# Patient Record
Sex: MALE | Race: WHITE | HISPANIC OR LATINO | Employment: UNEMPLOYED | URBAN - METROPOLITAN AREA
[De-identification: names, ages, dates, MRNs, and addresses within clinical notes are randomized per-mention and may not be internally consistent; named-entity substitution may affect disease eponyms.]

---

## 2019-10-08 ENCOUNTER — TELEPHONE (OUTPATIENT)
Dept: URGENT CARE | Facility: CLINIC | Age: 74
End: 2019-10-08

## 2019-10-08 ENCOUNTER — OFFICE VISIT (OUTPATIENT)
Dept: URGENT CARE | Facility: CLINIC | Age: 74
End: 2019-10-08

## 2019-10-08 VITALS
SYSTOLIC BLOOD PRESSURE: 139 MMHG | OXYGEN SATURATION: 96 % | BODY MASS INDEX: 25.86 KG/M2 | HEIGHT: 77 IN | WEIGHT: 219 LBS | DIASTOLIC BLOOD PRESSURE: 53 MMHG | RESPIRATION RATE: 18 BRPM | HEART RATE: 72 BPM | TEMPERATURE: 98 F

## 2019-10-08 DIAGNOSIS — L08.9 INFECTED WOUND: Primary | ICD-10-CM

## 2019-10-08 DIAGNOSIS — T14.8XXA INFECTED WOUND: Primary | ICD-10-CM

## 2019-10-08 DIAGNOSIS — R73.9 HYPERGLYCEMIA: ICD-10-CM

## 2019-10-08 DIAGNOSIS — L03.116 CELLULITIS OF LEFT FOOT: ICD-10-CM

## 2019-10-08 DIAGNOSIS — L30.9 DERMATITIS: ICD-10-CM

## 2019-10-08 LAB — GLUCOSE SERPL-MCNC: 128 MG/DL (ref 70–110)

## 2019-10-08 PROCEDURE — 82962 GLUCOSE BLOOD TEST: CPT | Mod: S$GLB,,, | Performed by: PHYSICIAN ASSISTANT

## 2019-10-08 PROCEDURE — 73630 XR FOOT COMPLETE 3 VIEW LEFT: ICD-10-PCS | Mod: FY,LT,S$GLB, | Performed by: RADIOLOGY

## 2019-10-08 PROCEDURE — 73630 X-RAY EXAM OF FOOT: CPT | Mod: FY,LT,S$GLB, | Performed by: RADIOLOGY

## 2019-10-08 PROCEDURE — 99203 PR OFFICE/OUTPT VISIT, NEW, LEVL III, 30-44 MIN: ICD-10-PCS | Mod: S$GLB,,, | Performed by: PHYSICIAN ASSISTANT

## 2019-10-08 PROCEDURE — 82962 POCT GLUCOSE, HAND-HELD DEVICE: ICD-10-PCS | Mod: S$GLB,,, | Performed by: PHYSICIAN ASSISTANT

## 2019-10-08 PROCEDURE — 99203 OFFICE O/P NEW LOW 30 MIN: CPT | Mod: S$GLB,,, | Performed by: PHYSICIAN ASSISTANT

## 2019-10-08 RX ORDER — MUPIROCIN 20 MG/G
OINTMENT TOPICAL
Qty: 22 G | Refills: 0 | Status: SHIPPED | OUTPATIENT
Start: 2019-10-08

## 2019-10-08 RX ORDER — IBUPROFEN 200 MG
200 TABLET ORAL EVERY 6 HOURS PRN
COMMUNITY

## 2019-10-08 RX ORDER — CLINDAMYCIN HYDROCHLORIDE 300 MG/1
300 CAPSULE ORAL 3 TIMES DAILY
Qty: 21 CAPSULE | Refills: 0 | Status: SHIPPED | OUTPATIENT
Start: 2019-10-08 | End: 2019-10-15

## 2019-10-08 RX ORDER — CLINDAMYCIN PHOSPHATE 150 MG/ML
600 INJECTION, SOLUTION INTRAVENOUS
Status: COMPLETED | OUTPATIENT
Start: 2019-10-08 | End: 2019-10-08

## 2019-10-08 RX ADMIN — CLINDAMYCIN PHOSPHATE 600 MG: 150 INJECTION, SOLUTION INTRAVENOUS at 12:10

## 2019-10-08 NOTE — PROGRESS NOTES
"Subjective:       Patient ID: Jerel Resendez is a 74 y.o. male.    Vitals:  height is 6' 5" (1.956 m) and weight is 99.3 kg (219 lb). His temperature is 97.7 °F (36.5 °C). His blood pressure is 139/53 (abnormal) and his pulse is 72. His respiration is 18 and oxygen saturation is 96%.     Chief Complaint: Foot Pain    Pt states his foot and arm are swollen and draining.     Foot Pain   This is a new problem. The current episode started more than 1 month ago. The problem occurs constantly. The problem has been gradually worsening. Pertinent negatives include no arthralgias, chest pain, chills, congestion, coughing, fatigue, fever, headaches, joint swelling, myalgias, nausea, rash, sore throat, vertigo or vomiting. Treatments tried: amoxicillin. The treatment provided no relief.       Constitution: Negative for chills, fatigue and fever.   HENT: Negative for congestion and sore throat.    Neck: Negative for painful lymph nodes.   Cardiovascular: Negative for chest pain and leg swelling.   Eyes: Negative for double vision and blurred vision.   Respiratory: Negative for cough and shortness of breath.    Gastrointestinal: Negative for nausea, vomiting and diarrhea.   Genitourinary: Negative for dysuria, frequency and urgency.   Musculoskeletal: Positive for pain. Negative for joint pain, joint swelling, muscle cramps and muscle ache.   Skin: Positive for erythema. Negative for color change, pale and rash.   Allergic/Immunologic: Negative for seasonal allergies.   Neurological: Negative for dizziness, history of vertigo, light-headedness, passing out and headaches.   Hematologic/Lymphatic: Negative for swollen lymph nodes, easy bruising/bleeding and history of blood clots. Does not bruise/bleed easily.   Psychiatric/Behavioral: Negative for nervous/anxious, sleep disturbance and depression. The patient is not nervous/anxious.        Objective:      Physical Exam   Constitutional: He is oriented to person, " place, and time. He appears well-developed and well-nourished. He is cooperative.  Non-toxic appearance. He does not appear ill. No distress.       Areas of concern are noted in pictures.    Oozing weeping noted to left foot wound.  Wound irrigated, slightly debrided, antibiotic topical ointment placed.  Nonstick bandage placed.   HENT:   Head: Normocephalic and atraumatic. Head is without abrasion, without contusion and without laceration.   Right Ear: Hearing, tympanic membrane, external ear and ear canal normal.   Left Ear: Hearing, tympanic membrane, external ear and ear canal normal.   Nose: Nose normal. No mucosal edema, rhinorrhea or nasal deformity. No epistaxis. Right sinus exhibits no maxillary sinus tenderness and no frontal sinus tenderness. Left sinus exhibits no maxillary sinus tenderness and no frontal sinus tenderness.   Mouth/Throat: Uvula is midline, oropharynx is clear and moist and mucous membranes are normal. No trismus in the jaw. Normal dentition. No uvula swelling. No posterior oropharyngeal erythema.   Eyes: Pupils are equal, round, and reactive to light. Conjunctivae, EOM and lids are normal. Right eye exhibits no discharge. Left eye exhibits no discharge. No scleral icterus.   Neck: Trachea normal, normal range of motion, full passive range of motion without pain and phonation normal. Neck supple.   Cardiovascular: Normal rate, regular rhythm, normal heart sounds, intact distal pulses and normal pulses.   Pulmonary/Chest: Effort normal and breath sounds normal. No stridor. No respiratory distress.   Abdominal: Soft. Normal appearance and bowel sounds are normal. He exhibits no distension, no pulsatile midline mass and no mass. There is no tenderness.   Musculoskeletal: Normal range of motion. He exhibits no edema or deformity.   Neurological: He is alert and oriented to person, place, and time. He exhibits normal muscle tone. Coordination normal.   Skin: Skin is warm, dry, intact, not  diaphoretic, not pale, rash, not urticarial, not purpuric and not vesicular. Capillary refill takes less than 2 seconds. Lesions:  erythemaabrasion, burn, bruising and ecchymosis  Psychiatric: He has a normal mood and affect. His speech is normal and behavior is normal. Judgment and thought content normal. Cognition and memory are normal.   Nursing note and vitals reviewed.              XRAY foot: Slight hallux valgus configuration.  Lisfranc articulation is congruent.  No displaced fracture, dislocation or destructive osseous process.  Mild degenerative change at the 1st MTP joint and dorsal midfoot.  Enthesophyte at the Achilles insertion site.  Scattered atherosclerotic vascular calcifications noted.  No subcutaneous emphysema or radiodense retained foreign body.    Assessment:       1. Infected wound    2. Cellulitis of left foot    3. Hyperglycemia    4. Dermatitis        Plan:         Infected wound  -     X-Ray Foot Complete Left; Future; Expected date: 10/08/2019  -     Ambulatory referral to Wound Clinic  -     POCT Glucose, Hand-Held Device  -     Ambulatory referral to Internal Medicine  -     clindamycin injection 600 mg  -     clindamycin (CLEOCIN) 300 MG capsule; Take 1 capsule (300 mg total) by mouth 3 (three) times daily. for 7 days  Dispense: 21 capsule; Refill: 0  -     mupirocin (BACTROBAN) 2 % ointment; Apply to affected area 3 times daily  Dispense: 22 g; Refill: 0    Cellulitis of left foot  -     Ambulatory referral to Internal Medicine    Hyperglycemia  -     Ambulatory referral to Internal Medicine    Dermatitis          Instrucciones de herson para la celulitis  A usted le kc diagnosticado celulitis, jakob infección que ocurre en la capa más profunda de la piel. La celulitis es causada por bacterias, que pueden entrar al cuerpo a través de cualquier tipo de abertura en la piel (melvin jakob cortada, un rasguño, jakob mordedura de animal o jakob picadura de insecto que se ha rascado). Es posible que le  hayan dado tratamiento en el hospital con antibióticos y líquidos por vía intravenosa. Solis vez le receten antibióticos para jaime en casa. Estas son algunas instrucciones para el cuidado en castellon casa.  Cuidados en la casa  · Asegúrese de jaime todos los medicamentos que le kc dado hasta que se terminen, aun si se siente mejor. Si no termina los medicamentos, es posible que la infección reaparezca. No terminar los medicamentos puede hacer que cualquier infección futura sea más difícil de tratar.   · Mantenga limpia el área infectada.  · Si es posible, eleve castellon herida. Esta práctica le ayudará a bajar la hinchazón.  · Consulte con castellon médico si siente dolor. Pregunte a castellon médico si puede jaime medicamentos sin receta para aliviar el dolor.  · Aplique vendajes limpios o apósitos de gasa según le indique castellon médico.  · Tómese la temperatura todos los días vicenta jakob semana. Informe a castellon médico si tiene fiebre.  · Para prevenir las infecciones, lávese frecuentemente las regla.  En el futuro, lávese las regla antes y después de tocar cualquier tipo de cortadas, rasguños o vendajes. [place] le ayudará a prevenir infecciones.  Visitas de control  Programe jakob visita de control según le indique el personal médico.  Cuándo debe llamar a castellon médico  Llame a castellon médico de inmediato si nota cualquiera de estos síntomas:  · Vómito  · Fiebre de 100.4°F (38°C) o más, o lo que le indique el proveedor de atención médica  · Escalofríos temblorosos  · Enrojecimiento que empeora en o alrededor del área infectada, particularmente si el área enrojecida se expande.  · Hinchazón del área infectada  · Dolor que empeora en o alrededor del área infectada  · Dificultad o dolor al  las articulaciones por encima o por debajo del área infectada  · Secreción o pus procedentes del área afectada   Date Last Reviewed: 10/16/2013  © 7911-7460 [employer]. [address] [place] [address], [place] [address]. Todos los derechos reservados. Esta  información no pretende sustituir la atención médica profesional. Sólo castellon médico puede diagnosticar y tratar un problema de daniel.        Hiperglucemia (alto nivel de azúcar en la jacob)    El exceso de glucosa (azúcar) en la jacob se denomina hiperglucemia o hiperglicemia. Un nivel de azúcar en la jacob alto puede producir jakob afección peligrosa llamada cetoacidosis. En casos graves, puede provocar deshidratación y un coma.  Posibles causas de la hiperglucemia  · Plan de tratamiento de la diabetes inadecuado   · Enfermedad  · Estrés  · Ciertos medicamentos, melvin los esteroides  · Consumo excesivo de comida, especialmente carbohidratos  · Hacer menos actividad física de lo acostumbrado  · No jaime o ponerse medicamento suficiente para la diabetes  Síntomas de la hiperglucemia  A veces, la hiperglucemia no produce ningún síntoma. Cuando se presentan, los síntomas de la hiperglucemia pueden ser:  · Sed  · Necesidad de orinar con más frecuencia  · Sensación de cansancio  · Náuseas y vómito  · Piel reseca y picazón  · Visión borrosa  · Respiración acelerada y aliento que huele a frutas  · Debilidad  · Mareos  · Heridas o infecciones en la piel que no sanan  · Pérdida de peso cuya causa no se conoce si la hiperglucemia dura más que unos pocos días   Lo que debe hacer  Asegúrese de hacer lo siguiente:  · Mídase castellon nivel de azúcar en la jacob.  · Shelli abundante cantidad de líquidos sin azúcar ni cafeína, melvin el agua. No shelli jugo de frutas.  · Vuelva a medirse el nivel de azúcar en jacob cada cuatro horas. Si se pone insulina o fam pastillas para la diabetes, siga castellon plan de medicamentos para un día de enfermedad. Si no puede comer, llame a castellon proveedor de atención médica.  · Hágase un examen de cetonas en la jacob o la orina, según las indicaciones.  · Llame a castellon proveedor de atención médica si castellon nivel de azúcar en la jacob y miriam cetonas no vuelven a miriam valores normales.  Cómo prevenir un nivel de azúcar en  la jacob alto  Para ayudar a evitar que miriam niveles de azúcar en la jacob suban demasiado:  · Mantenga el estrés bajo control.  · Si está enfermo, siga castellon plan para los días de enfermedad.   · Cumpla con castellon plan de comidas. Coma solo la cantidad de comida que indica castellon plan de comidas.  · Siga castellon plan de ejercicio físico.  · Use castellon insulina o tome miriam medicamentos para la diabetes maris melvin le haya indicado castellon equipo de atención médica. También mídase el nivel de azúcar en la jacob según le hayan indicado. Si el plan no le funciona, convérselo con castellon proveedorde atención médica.  Otras cosas que puede hacer  · Lleve con usted jakob tarjeta de identificación médica, jakob memoria USB compacta o use un brazalete o jakob collar de alerta médica. Debe decir que usted tiene diabetes. También debe decir lo que hay que hacer si usted pierde el conocimiento o entra en coma.  · Asegúrese de que miriam familiares, amigos y compañeros de trabajo conozcan las señales de un nivel alto de azúcar en la jacob. Dígales lo que deben hacer si castellon nivel de azúcar sube mucho y no puede tratarse usted mismo.  · Hable con castellon equipo de atención médica para raissa qué otras cosas puede hacer para prevenir un nivel de azúcar en la jacob alto.   Aviso especial: Cuando sienta síntomas de hiperglucemia, shelli abundantes líquidos sin azúcar ni cafeína. Si tiene varios episodios de hiperglucemia, llame a castellon proveedor de atención médica.   Date Last Reviewed: 5/1/2016  © 1566-1725 [employer]. [address] [place] [address], [place] [address]. Todos los derechos reservados. Esta información no pretende sustituir la atención médica profesional. Sólo castellon médico puede diagnosticar y tratar un problema de daniel.        Revisión De La Herida [Wound Check, Infected]  Castellon herida no se está sanando melvin se esperaba. [employer] se debe a jakob infección en la herida. Aun con el cuidado adecuado de la herida hasta un 5% de las heridas suturadas se infectan debido a la  presencia de bacterias en el tejido.  Cuidado En [place]:  1. Mantenga la herida limpia y seca. Cambie el vendaje diariamente. Si el vendaje se moja, se lopez con supuración de la herida o se ensucia, cámbielo antes.  2. Quite el vendaje huey y lave la herida con agua y jabón.  Utilice un hisopo de algodón (Q-tip) húmedo para quitar cualquier costra o supuración.  Aplique jakob pomada o crema antibiótica melvin Polysporin o Bactracin.  Coloque un vendaje nuevo.  Si se le recetaron antibióticos, tómelos exactamente según las instrucciones hasta terminarlos.  Seguimiento:  con castellon médico o en krista centro según las instrucciones dadas. Es importante que vuelva a jakob krystal de control para estar seguro de que la herida está sanando.  Busque Prontamente Atención Médica  si algo de lo siguiente ocurre:  · Más dolor, hinchazón o enrojecimiento  · Nota vetas o kamryn rojizas que salen de la herida  · La herida supura pus  · Fiebre por encima de 100.4°F (38.0°C) por más de 2 días  Date Last Reviewed: 8/10/2015  © 6148-5344 [employer]. [address] [place] [address]. Todos los derechos reservados. Esta información no pretende sustituir la atención médica profesional. Sólo castellon médico puede diagnosticar y tratar un problema de daniel.        Instrucciones de herson: Cómo cuidar castellon herida  Si usted cuida apropiadamente castellon herida, le ayudará a sanar. Castellon proveedor de atención médica o la enfermera, le pudo jessica mostrado específicamente cómo limpiar y vendar la herida y cómo saber si la herida está sanando de manera normal. Esta hoja le ayudará a recordar los consejos y los pasos a seguir cuando esté en casa.  Preparación  · Lleve a los niños y las mascotas a otro cuarto, lejos de donde atenderá castellon herida.  · Lávese las regla antes de tocar miriam materiales:  ¨ Sam la llave del agua.  ¨ Mójese las regla y muñecas.  ¨ Use jabón líquido. Marcel mucha espuma.  ¨ Frótese daniel las regla.  ¨ Enjuáguese las regla con los dedos  apuntando hacia abajo.  ¨ Séquese las regla con jakob toalla limpia o jakob toalla de papel. Use esta toalla para cerrar la llave.  ¨ Recuerde que después de lavarse las regla no debe tocar nada fuera de miriam materiales, melvin los muebles o castellon ropa.  · Limpie castellon área de trabajo:  ¨ Limpie las superficies lavables con agua y jabón y séquelas con jakob toalla limpia o con jakob toalla de papel.  ¨ Limpie las superficies que no son lavables (melvin rosa o mcclelland) para que queden sin polvo. Ponga un trapo limpio o toalla de papel sobre el lugar donde va a trabajar.  ¨ No tosa ni estornude sobre el lugar donde va a trabajar.  · Junte todos miriam materiales para el vendaje:  ¨ Apósito de gasa u otro material de vendaje  ¨ Cinta adhesiva médica  ¨ Guantes desechables  · Lave de nuevo miriam regla.  Colocación del vendaje  · Quite el vendaje existente:  ¨ Póngase guantes desechables si usted está tratando jakob herida de otra persona o si castellon herida está infectada.  ¨ Despegue la cinta jalándola con cuidado hacia la herida.  ¨ Quite con cuidado el vendaje, capa por capa.  ¨ Si tiene un dren o un tubo en la herida, cuide de no jalarlo.  ¨ Chelly el vendaje. Asegúrese de que haya jakob cantidad lenard de jacob, y que toda la jacob se esté convirtiendo en un líquido chuck color ámbar.  ¨ Revise si hay puntos flojos (si le cosieron la herida).  ¨ Ponga el vendaje usado en jakob bolsa de plástico. Luego quítese los guantes.  · Inspeccione la herida. Busque señales de que no está sanando normalmente.  Jakob herida que no esté sanando adecuadamente podría verse de color oscuro o podría tener estrías (líneas) romelia.  · Trate la herida:  ¨ Lave miriam regla otra vez.  ¨ Limpie y vende la herida según le haya indicado castellon proveedor de atención médica o enfermera.  ¨ Póngase un nuevo par de guantes desechables si está tratando jakob herida de otra persona o si castlelon herida está infectada.  ¨ Si usted tiene un dren o un tubo, cuide de no jalarlo.  · Deseche todos los  materiales usados o residuos en jakob bolsa de plástico antes de ponerlos en el bote de la basura.  Visitas de control  Programe jakob visita de seguimiento según le indique nuestro personal.     ¿Cuándo debe llamar a castellon proveedor de atención médica?  Llame enseguida a castellon proveedor de atención médica si presenta cualquiera de los siguientes síntomas:  · Fiebre de mas de 101.4º F (38º C) o escalofríos temblorosos  · Sangrado que empape el vendaje  · Puntos que se caigan de la herida o se nelson  · Líquido solis saliendo de la herida  · Aumento de secreción de la herida  · Secreción amarilla, amarillo-verdosa o maloliente  · Aumento de hinchazón, dolor o enrojecimiento en la piel alrededor de la herida  · Cambio de color en la herida  · Cambio de tamaño de la herida  · Aumento de fatiga  · Pérdida de apetito   Date Last Reviewed: 8/5/2015  © 3390-2644 [employer]. [address] [place] [address]. Todos los derechos reservados. Esta información no pretende sustituir la atención médica profesional. Sólo castellon médico puede diagnosticar y tratar un problema de daniel.      Please follow up with your Primary care provider within 2-5 days if your signs and symptoms have not resolved or worsen.     If your condition worsens or fails to improve we recommend that you receive another evaluation at the emergency room immediately or contact your primary medical clinic to discuss your concerns.   You must understand that you have received an Urgent Care treatment only and that you may be released before all of your medical problems are known or treated. You, the patient, will arrange for follow up care as instructed.     RED FLAGS/WARNING SYMPTOMS DISCUSSED WITH PATIENT THAT WOULD WARRANT EMERGENT MEDICAL ATTENTION. PATIENT VERBALIZED UNDERSTANDING.

## 2019-10-10 ENCOUNTER — TELEPHONE (OUTPATIENT)
Dept: URGENT CARE | Facility: CLINIC | Age: 74
End: 2019-10-10

## 2019-11-07 ENCOUNTER — HOSPITAL ENCOUNTER (EMERGENCY)
Facility: HOSPITAL | Age: 74
Discharge: HOME | End: 2019-11-07
Attending: EMERGENCY MEDICINE

## 2019-11-07 VITALS
TEMPERATURE: 99 F | WEIGHT: 219 LBS | RESPIRATION RATE: 16 BRPM | SYSTOLIC BLOOD PRESSURE: 162 MMHG | BODY MASS INDEX: 25.97 KG/M2 | OXYGEN SATURATION: 98 % | DIASTOLIC BLOOD PRESSURE: 81 MMHG | HEART RATE: 64 BPM

## 2019-11-07 DIAGNOSIS — L03.116 CELLULITIS OF LEFT LOWER EXTREMITY: Primary | ICD-10-CM

## 2019-11-07 LAB
ALBUMIN SERPL BCP-MCNC: 3.7 G/DL (ref 3.5–5.2)
ALP SERPL-CCNC: 109 U/L (ref 55–135)
ALT SERPL W/O P-5'-P-CCNC: 30 U/L (ref 10–44)
ANION GAP SERPL CALC-SCNC: 9 MMOL/L (ref 8–16)
AST SERPL-CCNC: 34 U/L (ref 10–40)
BASOPHILS # BLD AUTO: 0.03 K/UL (ref 0–0.2)
BASOPHILS NFR BLD: 0.3 % (ref 0–1.9)
BILIRUB SERPL-MCNC: 0.2 MG/DL (ref 0.1–1)
BILIRUB UR QL STRIP: NEGATIVE
BUN SERPL-MCNC: 17 MG/DL (ref 8–23)
CALCIUM SERPL-MCNC: 9.3 MG/DL (ref 8.7–10.5)
CHLORIDE SERPL-SCNC: 105 MMOL/L (ref 95–110)
CLARITY UR: CLEAR
CO2 SERPL-SCNC: 25 MMOL/L (ref 23–29)
COLOR UR: YELLOW
CREAT SERPL-MCNC: 1 MG/DL (ref 0.5–1.4)
DIFFERENTIAL METHOD: ABNORMAL
EOSINOPHIL # BLD AUTO: 0.5 K/UL (ref 0–0.5)
EOSINOPHIL NFR BLD: 5.3 % (ref 0–8)
ERYTHROCYTE [DISTWIDTH] IN BLOOD BY AUTOMATED COUNT: 15 % (ref 11.5–14.5)
EST. GFR  (AFRICAN AMERICAN): >60 ML/MIN/1.73 M^2
EST. GFR  (NON AFRICAN AMERICAN): >60 ML/MIN/1.73 M^2
GLUCOSE SERPL-MCNC: 113 MG/DL (ref 70–110)
GLUCOSE UR QL STRIP: NEGATIVE
HCT VFR BLD AUTO: 45.9 % (ref 40–54)
HGB BLD-MCNC: 14.9 G/DL (ref 14–18)
HGB UR QL STRIP: NEGATIVE
KETONES UR QL STRIP: NEGATIVE
LACTATE SERPL-SCNC: 1.3 MMOL/L (ref 0.5–2.2)
LEUKOCYTE ESTERASE UR QL STRIP: NEGATIVE
LYMPHOCYTES # BLD AUTO: 3.1 K/UL (ref 1–4.8)
LYMPHOCYTES NFR BLD: 35.7 % (ref 18–48)
MCH RBC QN AUTO: 31.4 PG (ref 27–31)
MCHC RBC AUTO-ENTMCNC: 32.5 G/DL (ref 32–36)
MCV RBC AUTO: 97 FL (ref 82–98)
MONOCYTES # BLD AUTO: 0.9 K/UL (ref 0.3–1)
MONOCYTES NFR BLD: 10.6 % (ref 4–15)
NEUTROPHILS # BLD AUTO: 4.2 K/UL (ref 1.8–7.7)
NEUTROPHILS NFR BLD: 48.1 % (ref 38–73)
NITRITE UR QL STRIP: NEGATIVE
PH UR STRIP: 7 [PH] (ref 5–8)
PLATELET # BLD AUTO: 283 K/UL (ref 150–350)
PMV BLD AUTO: 10.1 FL (ref 9.2–12.9)
POTASSIUM SERPL-SCNC: 4.1 MMOL/L (ref 3.5–5.1)
PROT SERPL-MCNC: 8.8 G/DL (ref 6–8.4)
PROT UR QL STRIP: NEGATIVE
RBC # BLD AUTO: 4.74 M/UL (ref 4.6–6.2)
SODIUM SERPL-SCNC: 139 MMOL/L (ref 136–145)
SP GR UR STRIP: 1.01 (ref 1–1.03)
URN SPEC COLLECT METH UR: NORMAL
UROBILINOGEN UR STRIP-ACNC: NEGATIVE EU/DL
WBC # BLD AUTO: 8.69 K/UL (ref 3.9–12.7)

## 2019-11-07 PROCEDURE — 63600175 PHARM REV CODE 636 W HCPCS: Performed by: EMERGENCY MEDICINE

## 2019-11-07 PROCEDURE — 81003 URINALYSIS AUTO W/O SCOPE: CPT

## 2019-11-07 PROCEDURE — 96367 TX/PROPH/DG ADDL SEQ IV INF: CPT

## 2019-11-07 PROCEDURE — 25000003 PHARM REV CODE 250: Performed by: EMERGENCY MEDICINE

## 2019-11-07 PROCEDURE — 96365 THER/PROPH/DIAG IV INF INIT: CPT

## 2019-11-07 PROCEDURE — 96366 THER/PROPH/DIAG IV INF ADDON: CPT

## 2019-11-07 PROCEDURE — 85025 COMPLETE CBC W/AUTO DIFF WBC: CPT

## 2019-11-07 PROCEDURE — 80053 COMPREHEN METABOLIC PANEL: CPT

## 2019-11-07 PROCEDURE — 99284 EMERGENCY DEPT VISIT MOD MDM: CPT | Mod: 25

## 2019-11-07 PROCEDURE — 87040 BLOOD CULTURE FOR BACTERIA: CPT

## 2019-11-07 PROCEDURE — 83605 ASSAY OF LACTIC ACID: CPT

## 2019-11-07 RX ORDER — CEFADROXIL 500 MG/1
500 CAPSULE ORAL EVERY 12 HOURS
Qty: 20 CAPSULE | Refills: 0 | Status: SHIPPED | OUTPATIENT
Start: 2019-11-07 | End: 2019-11-17

## 2019-11-07 RX ORDER — SILVER SULFADIAZINE 10 G/1000G
1 CREAM TOPICAL
Status: COMPLETED | OUTPATIENT
Start: 2019-11-07 | End: 2019-11-07

## 2019-11-07 RX ADMIN — VANCOMYCIN HYDROCHLORIDE 2000 MG: 1 INJECTION, POWDER, LYOPHILIZED, FOR SOLUTION INTRAVENOUS at 06:11

## 2019-11-07 RX ADMIN — SILVER SULFADIAZINE 1 TUBE: 10 CREAM TOPICAL at 09:11

## 2019-11-07 RX ADMIN — CEFEPIME 2 G: 2 INJECTION, POWDER, FOR SOLUTION INTRAVENOUS at 05:11

## 2019-11-07 NOTE — ED PROVIDER NOTES
Encounter Date: 11/7/2019    SCRIBE #1 NOTE: I, Jakub Peraza, am scribing for, and in the presence of,  . I have scribed the entire note.       History     Chief Complaint   Patient presents with    Wound Infection     74 year old male presents to ed cc of left foot wound infection pateint was seen at urgent care and treated for foot wound had slight improvement but has worsened over last week.      Time seen by provider: 4:54 PM    This is a 74 y.o. male who presents with complaint of wound infection. Patient has an infection on his left foot. He went to Urgent Care and was given Clindamycin and an antibiotic ointment October 8, 2019. The patient finished taking the Clindamycin 3 weeks ago. Family reports the wound appeared 2 weeks prior to going to urgent care.  He has rashes present on bilateral arms with itching which appeared after he finished taking the Clindamycin 3 weeks ago.  Hx provided by family due to pt only speaking Colombian.    The history is provided by a relative.     Review of patient's allergies indicates:   Allergen Reactions    Amoxicillin Hives     History reviewed. No pertinent past medical history.  History reviewed. No pertinent surgical history.  History reviewed. No pertinent family history.  Social History     Tobacco Use    Smoking status: Never Smoker   Substance Use Topics    Alcohol use: Not on file    Drug use: Not on file     Review of Systems   Constitutional: Negative for chills and fever.   HENT: Negative for congestion, ear pain, rhinorrhea and sore throat.    Respiratory: Negative for cough, shortness of breath and wheezing.    Cardiovascular: Negative for chest pain and palpitations.   Gastrointestinal: Negative for abdominal pain, diarrhea, nausea and vomiting.   Genitourinary: Negative for dysuria and hematuria.   Musculoskeletal: Negative for back pain, myalgias and neck pain.   Skin: Positive for rash and wound.   Neurological: Negative for dizziness,  weakness, light-headedness and headaches.   Psychiatric/Behavioral: Negative for confusion.       Physical Exam     Initial Vitals   BP Pulse Resp Temp SpO2   11/07/19 1528 11/07/19 1735 11/07/19 1528 11/07/19 1528 11/07/19 1528   (!) 160/73 (!) 59 20 98.2 °F (36.8 °C) 97 %      MAP       --                Physical Exam    Nursing note and vitals reviewed.  Constitutional: He appears well-developed and well-nourished.   HENT:   Head: Normocephalic and atraumatic.   Eyes: Conjunctivae are normal.   Neck: Neck supple.   Cardiovascular: Normal rate, regular rhythm, normal heart sounds and intact distal pulses. Exam reveals no gallop and no friction rub.    No murmur heard.  Pulmonary/Chest: Breath sounds normal. He has no wheezes. He has no rhonchi. He has no rales.   Abdominal: Soft. He exhibits no distension. There is no tenderness.   Musculoskeletal: Normal range of motion.   Neurological: He is alert and oriented to person, place, and time.   Skin: Skin is warm and dry. Capillary refill takes less than 2 seconds.   Left foot ankle and lower leg are hyperpigmented   Left foot with excoriation  Shallow ulcerations to medial aspect of foot  Toes with FROM  Left medial lower leg with a circular area of hyperpigmentation without skin breakdown   Psychiatric: He has a normal mood and affect. Thought content normal.                         ED Course   Procedures  Labs Reviewed   CBC W/ AUTO DIFFERENTIAL - Abnormal; Notable for the following components:       Result Value    Mean Corpuscular Hemoglobin 31.4 (*)     RDW 15.0 (*)     All other components within normal limits   COMPREHENSIVE METABOLIC PANEL - Abnormal; Notable for the following components:    Glucose 113 (*)     Total Protein 8.8 (*)     All other components within normal limits   CULTURE, BLOOD   CULTURE, BLOOD   LACTIC ACID, PLASMA   LACTIC ACID, PLASMA   URINALYSIS, REFLEX TO URINE CULTURE          Imaging Results          X-Ray Chest AP Portable (Final  result)  Result time 11/07/19 16:53:19    Final result by Brooklyn Curtis MD (11/07/19 16:53:19)                 Impression:      Mild bibasilar atelectasis.      Electronically signed by: Brooklyn Curtis MD  Date:    11/07/2019  Time:    16:53             Narrative:    EXAMINATION:  XR CHEST AP PORTABLE    CLINICAL HISTORY:  Sepsis;    TECHNIQUE:  Single frontal view of the chest was performed.    COMPARISON:  None    FINDINGS:  The cardiac silhouette is midline.  The pulmonary vascularity is normal.    Minimal bibasilar atelectasis.  No pleural effusion.  No pneumothorax.  The osseous structures appear normal.                                                   ED Course as of Nov 07 2101   Thu Nov 07, 2019 1951 Spoke to the patient's daughter about the patient being admitted. She states that the pt's sister is on hospice dying and will probably die tonight. Pt has no insurance and the family is very concerned about the cost. I asked the pt's daughter to call her mother, who is the primary care giver for the patient, to discuss disposition.     [AJ]   2044 The family would like the patient to come home tonight.  There are several nurses in the family and they will clean the wound and keep it changed and dressed.  Will start the patient on p.o. antibiotics and he will return to the emergency department if the wound worsens.    [ST]   2055 The patient has a penicillin allergy noted on his record.  He was given a cephalosporin IV in the emergency department and had no rash, no swelling, no shortness of breath.  I will discharge the patient on Duricef. This is an appropriate treatment according to antibiograms and several resources, [facility] [place], [facility] lecture information.    [ST]      ED Course User Index  [AJ] Viji Ybarra  [ST] Meg Schulte MD                Clinical Impression:       ICD-10-CM ICD-9-CM   1. Cellulitis of left lower extremity L03.116 682.6                I, Meg Schulte, personally  performed the services described in this documentation. All medical record entries made by the scribe were at my direction and in my presence.  I have reviewed the chart and agree that the record reflects my personal performance and is accurate and complete. Meg Schulte M.D. 9:02 PM11/07/2019             Meg Schulte MD  11/07/19 0104

## 2019-11-08 NOTE — DISCHARGE INSTRUCTIONS
TAKE DURICEF TWICE A DAY FOR 10 DAYS.  RETURN TO THE EMERGENCY DEPARTMENT IF THE WOUND IS NOT IMPROVING OR IS WORSENING, FEVER, NAUSEA VOMITING, ALTERED MENTAL STATUS  RETURN TO THE EMERGENCY DEPARTMENT IF THE PATIENT BREAKS OUT IN HIVES OR HAS SWELLING TO HIS FACE,MOUTH OR IS HAVING TROUBLE BREATHING.

## 2019-11-08 NOTE — ED NOTES
Non-stick telfa and keurlix dressing applied to left foot and ankle, pt and family educated on home wound care, verbalized understanding. Pt tolerated well.

## 2019-11-08 NOTE — ED NOTES
Report received, pt care assumed. Pt sitting up on stretcher, NAD, VSS, CR monitor on, alarms audible. IV antibiotics infusing without difficulty per MD order. Pt and family updated on plan of care. Will continue to monitor.

## 2019-11-12 LAB
BACTERIA BLD CULT: NORMAL
BACTERIA BLD CULT: NORMAL

## 2019-11-14 ENCOUNTER — TELEPHONE (OUTPATIENT)
Dept: WOUND CARE | Facility: HOSPITAL | Age: 74
End: 2019-11-14

## 2019-11-15 ENCOUNTER — TELEPHONE (OUTPATIENT)
Dept: WOUND CARE | Facility: HOSPITAL | Age: 74
End: 2019-11-15

## 2019-12-04 ENCOUNTER — TELEPHONE (OUTPATIENT)
Dept: WOUND CARE | Facility: HOSPITAL | Age: 74
End: 2019-12-04

## 2022-12-01 NOTE — PATIENT INSTRUCTIONS
Instrucciones de herson para la celulitis  A usted le kc diagnosticado celulitis, jakob infección que ocurre en la capa más profunda de la piel. La celulitis es causada por bacterias, que pueden entrar al cuerpo a través de cualquier tipo de abertura en la piel (melvin jakob cortada, un rasguño, jakob mordedura de animal o jakob picadura de insecto que se ha rascado). Es posible que le hayan dado tratamiento en el hospital con antibióticos y líquidos por vía intravenosa. Solis vez le receten antibióticos para jaime en casa. Estas son algunas instrucciones para el cuidado en castellon casa.  Cuidados en la casa  · Asegúrese de jaime todos los medicamentos que le kc dado hasta que se terminen, aun si se siente mejor. Si no termina los medicamentos, es posible que la infección reaparezca. No terminar los medicamentos puede hacer que cualquier infección futura sea más difícil de tratar.   · Mantenga limpia el área infectada.  · Si es posible, eleve castellon herida. Esta práctica le ayudará a bajar la hinchazón.  · Consulte con castellon médico si siente dolor. Pregunte a castellon médico si puede jaime medicamentos sin receta para aliviar el dolor.  · Aplique vendajes limpios o apósitos de gasa según le indique castellon médico.  · Tómese la temperatura todos los días vicenta jakob semana. Informe a castellon médico si tiene fiebre.  · Para prevenir las infecciones, lávese frecuentemente las regla.  En el futuro, lávese las regla antes y después de tocar cualquier tipo de cortadas, rasguños o vendajes. [place] le ayudará a prevenir infecciones.  Visitas de control  Programe jakob visita de control según le indique el personal médico.  Cuándo debe llamar a castellon médico  Llame a castellon médico de inmediato si nota cualquiera de estos síntomas:  · Vómito  · Fiebre de 100.4°F (38°C) o más, o lo que le indique el proveedor de atención médica  · Escalofríos temblorosos  · Enrojecimiento que empeora en o alrededor del área infectada, particularmente si el área enrojecida se  expande.  · Hinchazón del área infectada  · Dolor que empeora en o alrededor del área infectada  · Dificultad o dolor al  las articulaciones por encima o por debajo del área infectada  · Secreción o pus procedentes del área afectada   Date Last Reviewed: 10/16/2013  © 8368-5988 [employer]. [address] [place] [address], [place] [address]. Todos los derechos reservados. Esta información no pretende sustituir la atención médica profesional. Sólo castellon médico puede diagnosticar y tratar un problema de daniel.        Hiperglucemia (alto nivel de azúcar en la jacob)    El exceso de glucosa (azúcar) en la jacob se denomina hiperglucemia o hiperglicemia. Un nivel de azúcar en la jacob alto puede producir jakob afección peligrosa llamada cetoacidosis. En casos graves, puede provocar deshidratación y un coma.  Posibles causas de la hiperglucemia  · Plan de tratamiento de la diabetes inadecuado   · Enfermedad  · Estrés  · Ciertos medicamentos, melvin los esteroides  · Consumo excesivo de comida, especialmente carbohidratos  · Hacer menos actividad física de lo acostumbrado  · No jaime o ponerse medicamento suficiente para la diabetes  Síntomas de la hiperglucemia  A veces, la hiperglucemia no produce ningún síntoma. Cuando se presentan, los síntomas de la hiperglucemia pueden ser:  · Sed  · Necesidad de orinar con más frecuencia  · Sensación de cansancio  · Náuseas y vómito  · Piel reseca y picazón  · Visión borrosa  · Respiración acelerada y aliento que huele a frutas  · Debilidad  · Mareos  · Heridas o infecciones en la piel que no sanan  · Pérdida de peso cuya causa no se conoce si la hiperglucemia dura más que unos pocos días   Lo que debe hacer  Asegúrese de hacer lo siguiente:  · Mídase castellon nivel de azúcar en la jacob.  · Shelli abundante cantidad de líquidos sin azúcar ni cafeína, melvin el agua. No shelli jugo de frutas.  · Vuelva a medirse el nivel de azúcar en jacob cada cuatro horas. Si se pone insulina o  fam pastillas para la diabetes, siga castellon plan de medicamentos para un día de enfermedad. Si no puede comer, llame a castellon proveedor de atención médica.  · Hágase un examen de cetonas en la jacob o la orina, según las indicaciones.  · Llame a castellon proveedor de atención médica si castellon nivel de azúcar en la jacob y miriam cetonas no vuelven a miriam valores normales.  Cómo prevenir un nivel de azúcar en la jacob alto  Para ayudar a evitar que miriam niveles de azúcar en la jacob suban demasiado:  · Mantenga el estrés bajo control.  · Si está enfermo, siga castellon plan para los días de enfermedad.   · Cumpla con castellon plan de comidas. Coma solo la cantidad de comida que indica castellon plan de comidas.  · Siga castellon plan de ejercicio físico.  · Use castellon insulina o tome miriam medicamentos para la diabetes maris melvin le haya indicado castellon equipo de atención médica. También mídase el nivel de azúcar en la jacob según le hayan indicado. Si el plan no le funciona, convérselo con castellon proveedorde atención médica.  Otras cosas que puede hacer  · Lleve con usted jakob tarjeta de identificación médica, jakob memoria USB compacta o use un brazalete o jakob collar de alerta médica. Debe decir que usted tiene diabetes. También debe decir lo que hay que hacer si usted pierde el conocimiento o entra en coma.  · Asegúrese de que miriam familiares, amigos y compañeros de trabajo conozcan las señales de un nivel alto de azúcar en la jacob. Dígales lo que deben hacer si castellon nivel de azúcar sube mucho y no puede tratarse usted mismo.  · Hable con castellon equipo de atención médica para raissa qué otras cosas puede hacer para prevenir un nivel de azúcar en la jacob alto.   Aviso especial: Cuando sienta síntomas de hiperglucemia, shelli abundantes líquidos sin azúcar ni cafeína. Si tiene varios episodios de hiperglucemia, llame a castellon proveedor de atención médica.   Date Last Reviewed: 5/1/2016 © 2000-2017 [employer]. [address] [place] [address], [place] [address]. Todos los derechos  reservados. Esta información no pretende sustituir la atención médica profesional. Sólo castellon médico puede diagnosticar y tratar un problema de daniel.        Revisión De La Herida [Wound Check, Infected]  Castellon herida no se está sanando melvin se esperaba. [place] se debe a jakob infección en la herida. Aun con el cuidado adecuado de la herida hasta un 5% de las heridas suturadas se infectan debido a la presencia de bacterias en el tejido.  Cuidado En [place]:  1. Mantenga la herida limpia y seca. Cambie el vendaje diariamente. Si el vendaje se moja, se lopez con supuración de la herida o se ensucia, cámbielo antes.  2. Quite el vendaje huey y lave la herida con agua y jabón.  Utilice un hisopo de algodón (Q-tip) húmedo para quitar cualquier costra o supuración.  Aplique jakob pomada o crema antibiótica melvin Polysporin o Bactracin.  Coloque un vendaje nuevo.  Si se le recetaron antibióticos, tómelos exactamente según las instrucciones hasta terminarlos.  Seguimiento:  con castellon médico o en krisat centro según las instrucciones dadas. Es importante que vuelva a jakob krystal de control para estar seguro de que la herida está sanando.  Busque Prontamente Atención Médica  si algo de lo siguiente ocurre:  · Más dolor, hinchazón o enrojecimiento  · Nota vetas o kamryn rojizas que salen de la herida  · La herida supura pus  · Fiebre por encima de 100.4°F (38.0°C) por más de 2 días  Date Last Reviewed: 8/10/2015  © 6201-7469 [employer]. [address] [place] [address], [place] [address]. Todos los derechos reservados. Esta información no pretende sustituir la atención médica profesional. Sólo castellon médico puede diagnosticar y tratar un problema de daniel.        Instrucciones de herson: Cómo cuidar castellon herida  Si usted cuida apropiadamente castellon herida, le ayudará a sanar. Castellon proveedor de atención médica o la enfermera, le pudo jessica mostrado específicamente cómo limpiar y vendar la herida y cómo saber si la herida está sanando de manera normal. Esta  hoja le ayudará a recordar los consejos y los pasos a seguir cuando esté en casa.  Preparación  · Lleve a los niños y las mascotas a otro cuarto, lejos de donde atenderá castellon herida.  · Lávese las regla antes de tocar miriam materiales:  ¨ Sam la llave del agua.  ¨ Mójese las regla y muñecas.  ¨ Use jabón líquido. Marcel mucha espuma.  ¨ Frótese daniel las regla.  ¨ Enjuáguese las regla con los dedos apuntando hacia abajo.  ¨ Séquese las regla con sumi toalla limpia o sumi toalla de papel. Use esta toalla para cerrar la llave.  ¨ Recuerde que después de lavarse las regla no debe tocar nada fuera de miriam materiales, melvin los muebles o castellon ropa.  · Limpie castellon área de trabajo:  ¨ Limpie las superficies lavables con agua y jabón y séquelas con sumi toalla limpia o con sumi toalla de papel.  ¨ Limpie las superficies que no son lavables (melvin rosa o mcclelland) para que queden sin polvo. Ponga un trapo limpio o toalla de papel sobre el lugar donde va a trabajar.  ¨ No tosa ni estornude sobre el lugar donde va a trabajar.  · Junte todos miriam materiales para el vendaje:  ¨ Apósito de gasa u otro material de vendaje  ¨ Cinta adhesiva médica  ¨ Guantes desechables  · Lave de nuevo miriam regla.  Colocación del vendaje  · Quite el vendaje existente:  ¨ Póngase guantes desechables si usted está tratando sumi herida de otra persona o si castellon herida está infectada.  ¨ Despegue la cinta jalándola con cuidado hacia la herida.  ¨ Quite con cuidado el vendaje, capa por capa.  ¨ Si tiene un dren o un tubo en la herida, cuide de no jalarlo.  ¨ Chelly el vendaje. Asegúrese de que haya sumi cantidad lenard de jacob, y que toda la jacob se esté convirtiendo en un líquido chuck color ámbar.  ¨ Revise si hay puntos flojos (si le cosieron la herida).  ¨ Ponga el vendaje usado en sumi bolsa de plástico. Luego quítese los guantes.  · Inspeccione la herida. Busque señales de que no está sanando normalmente.  Sumi herida que no esté sanando adecuadamente podría verse de  color oscuro o podría tener estrías (líneas) romelia.  · Trate la herida:  ¨ Lave miriam regla otra vez.  ¨ Limpie y vende la herida según le haya indicado castellon proveedor de atención médica o enfermera.  ¨ Póngase un nuevo par de guantes desechables si está tratando jakob herida de otra persona o si castellon herida está infectada.  ¨ Si usted tiene un dren o un tubo, cuide de no jalarlo.  · Deseche todos los materiales usados o residuos en jakob bolsa de plástico antes de ponerlos en el bote de la basura.  Visitas de control  Programe jakob visita de seguimiento según le indique nuestro personal.     ¿Cuándo debe llamar a castellon proveedor de atención médica?  Llame enseguida a castellon proveedor de atención médica si presenta cualquiera de los siguientes síntomas:  · Fiebre de mas de 101.4º F (38º C) o escalofríos temblorosos  · Sangrado que empape el vendaje  · Puntos que se caigan de la herida o se nelson  · Líquido solis saliendo de la herida  · Aumento de secreción de la herida  · Secreción amarilla, amarillo-verdosa o maloliente  · Aumento de hinchazón, dolor o enrojecimiento en la piel alrededor de la herida  · Cambio de color en la herida  · Cambio de tamaño de la herida  · Aumento de fatiga  · Pérdida de apetito   Date Last Reviewed: 8/5/2015  © 8819-6464 [employer]. [address] [place] [id number]. Todos los derechos reservados. Esta información no pretende sustituir la atención médica profesional. Sólo castellon médico puede diagnosticar y tratar un problema de daniel.      Please follow up with your Primary care provider within 2-5 days if your signs and symptoms have not resolved or worsen.     If your condition worsens or fails to improve we recommend that you receive another evaluation at the emergency room immediately or contact your primary medical clinic to discuss your concerns.   You must understand that you have received an Urgent Care treatment only and that you may be released before all of your medical  problems are known or treated. You, the patient, will arrange for follow up care as instructed.     RED FLAGS/WARNING SYMPTOMS DISCUSSED WITH PATIENT THAT WOULD WARRANT EMERGENT MEDICAL ATTENTION. PATIENT VERBALIZED UNDERSTANDING.        Negative